# Patient Record
Sex: FEMALE | Race: WHITE
[De-identification: names, ages, dates, MRNs, and addresses within clinical notes are randomized per-mention and may not be internally consistent; named-entity substitution may affect disease eponyms.]

---

## 2022-01-21 ENCOUNTER — NURSE TRIAGE (OUTPATIENT)
Dept: OTHER | Facility: CLINIC | Age: 22
End: 2022-01-21

## 2022-01-22 NOTE — TELEPHONE ENCOUNTER
Reason for Disposition   [1] Caller has URGENT medicine question about med that PCP or specialist prescribed AND [2] triager unable to answer question    Protocols used: MEDICATION QUESTION CALL-ADULT-AH    Subjective: Caller states \"I have some questions about a medication I started taking last night\"     Current Symptoms: Heart palpitations, dilated pupils, neck feels stiff, tongue feels large. Patient spoke with prescribing doctor today and he told her to take 1/2 dose of new SSRI. Caller asking this RN if she should stop all together. Advised to reach out to PCP for that answer. Onset: 24 hours    Associated Symptoms: reduced activity    Pain Severity:NA    Temperature: NA    What has been tried: Nothing      Recommended disposition: Call PCP    Care advice provided, patient verbalizes understanding; denies any other questions or concerns; instructed to call back for any new or worsening symptoms. Will follow up with PCP    This triage is a result of a call to [address] [place] [address]. Please do not respond to the triage nurse through this encounter. Any subsequent communication should be directly with the patient.

## 2023-01-12 ENCOUNTER — NURSE TRIAGE (OUTPATIENT)
Dept: OTHER | Facility: CLINIC | Age: 23
End: 2023-01-12

## 2023-01-12 NOTE — TELEPHONE ENCOUNTER
Subjective: Caller states \"I am having a sore throat and congestion. And I am wondering if I can take mucinex with my other medications? \"   Writer reviewed med list with client.

## 2023-02-28 PROBLEM — K59.00 CONSTIPATION: Status: ACTIVE | Noted: 2023-02-28

## 2023-02-28 PROBLEM — R10.9 ABDOMINAL PAIN: Status: ACTIVE | Noted: 2023-02-28

## 2023-02-28 PROBLEM — L98.0 PYOGENIC GRANULOMA: Status: ACTIVE | Noted: 2023-02-28

## 2023-02-28 PROBLEM — F41.8 DEPRESSION WITH ANXIETY: Status: ACTIVE | Noted: 2023-02-28

## 2023-02-28 PROBLEM — N64.52 NIPPLE DISCHARGE: Status: ACTIVE | Noted: 2023-02-28

## 2023-02-28 PROBLEM — R31.9 BLOOD IN URINE: Status: ACTIVE | Noted: 2023-02-28

## 2023-02-28 PROBLEM — U07.1 COVID-19: Status: ACTIVE | Noted: 2023-02-28

## 2023-02-28 PROBLEM — N89.8 VAGINAL DISCHARGE: Status: ACTIVE | Noted: 2023-02-28

## 2023-02-28 PROBLEM — L03.031 PARONYCHIA OF TOE OF RIGHT FOOT: Status: ACTIVE | Noted: 2023-02-28

## 2023-02-28 PROBLEM — E03.9 HYPOTHYROIDISM: Status: ACTIVE | Noted: 2023-02-28

## 2023-02-28 PROBLEM — K60.2 ANAL FISSURE: Status: ACTIVE | Noted: 2023-02-28

## 2023-02-28 LAB — PROLACTIN (UG/L) IN SER/PLAS: 11.6 UG/L (ref 3–20)

## 2023-02-28 RX ORDER — LEVOTHYROXINE SODIUM 25 UG/1
1 TABLET ORAL DAILY
COMMUNITY
Start: 2022-01-18 | End: 2023-06-12 | Stop reason: ALTCHOICE

## 2023-02-28 RX ORDER — SPIRONOLACTONE 25 MG/1
1 TABLET ORAL DAILY
COMMUNITY

## 2023-02-28 RX ORDER — THYROID 30 MG/1
1 TABLET ORAL DAILY
COMMUNITY
Start: 2022-01-18 | End: 2023-06-12 | Stop reason: ALTCHOICE

## 2023-02-28 RX ORDER — SPIRONOLACTONE 100 MG/1
100 TABLET, FILM COATED ORAL DAILY
COMMUNITY

## 2023-03-06 ENCOUNTER — APPOINTMENT (OUTPATIENT)
Dept: PRIMARY CARE | Facility: CLINIC | Age: 23
End: 2023-03-06
Payer: COMMERCIAL

## 2023-03-17 ENCOUNTER — TELEPHONE (OUTPATIENT)
Dept: PRIMARY CARE | Facility: CLINIC | Age: 23
End: 2023-03-17
Payer: COMMERCIAL

## 2023-03-17 NOTE — TELEPHONE ENCOUNTER
Pt can call [facility], [phone] for an appt. I believe she needs to see GI first and they can decide if she needs an EGD, no order to be placed by PCP,    Thank you,  Leora Guallpa, DO

## 2023-03-17 NOTE — TELEPHONE ENCOUNTER
Pt is calling and saying at her last visit you gave her a referral for Gastroenterology to get an endoscopy done. When she called to schedule they told her this referral is for a regular office visit and they are booking out till September. Pt is asking if you can put in an order for an endoscopy so she can get this done sooner if possible?

## 2023-06-12 ENCOUNTER — OFFICE VISIT (OUTPATIENT)
Dept: PRIMARY CARE | Facility: CLINIC | Age: 23
End: 2023-06-12
Payer: COMMERCIAL

## 2023-06-12 VITALS — SYSTOLIC BLOOD PRESSURE: 90 MMHG | WEIGHT: 126 LBS | DIASTOLIC BLOOD PRESSURE: 62 MMHG

## 2023-06-12 DIAGNOSIS — J30.2 SEASONAL ALLERGIES: ICD-10-CM

## 2023-06-12 DIAGNOSIS — E03.9 HYPOTHYROIDISM, UNSPECIFIED TYPE: Primary | ICD-10-CM

## 2023-06-12 PROCEDURE — 1036F TOBACCO NON-USER: CPT | Performed by: FAMILY MEDICINE

## 2023-06-12 PROCEDURE — 99213 OFFICE O/P EST LOW 20 MIN: CPT | Performed by: FAMILY MEDICINE

## 2023-06-12 RX ORDER — LEVOTHYROXINE SODIUM 75 UG/1
75 TABLET ORAL
Qty: 30 TABLET | Refills: 2 | COMMUNITY
Start: 2023-04-27 | End: 2023-07-26

## 2023-06-12 ASSESSMENT — ENCOUNTER SYMPTOMS
SORE THROAT: 1
CARDIOVASCULAR NEGATIVE: 1
COUGH: 1
FATIGUE: 1
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
## 2023-08-07 ENCOUNTER — PATIENT OUTREACH (OUTPATIENT)
Dept: CARDIOLOGY | Facility: CLINIC | Age: 23
End: 2023-08-07
Payer: COMMERCIAL

## 2023-08-07 ENCOUNTER — TELEPHONE (OUTPATIENT)
Dept: PRIMARY CARE | Facility: CLINIC | Age: 23
End: 2023-08-07
Payer: COMMERCIAL

## 2023-08-07 ENCOUNTER — DOCUMENTATION (OUTPATIENT)
Dept: PRIMARY CARE | Facility: CLINIC | Age: 23
End: 2023-08-07
Payer: COMMERCIAL

## 2023-08-07 NOTE — TELEPHONE ENCOUNTER
Spoke with pt she states that her head hurts her when she gets up and when she is thinking. She said she doesn't have pain when she lays down. Pt states that she took Tylenol which helped with the pain a little. Pt would like to know should she still go to the ER?

## 2023-08-07 NOTE — PROGRESS NOTES
Discharge Facility: [facility]  Discharge Diagnosis: Migraine  Admission Date: 8-3-23  Discharge Date: 8-4-23    PCP Appointment Date: 8-10-23  Specialist Appointment Date: none  Hospital Encounter and Summary: Linked  See discharge assessment below for further details

## 2023-08-07 NOTE — TELEPHONE ENCOUNTER
Pt was in the ER for migraines. Pt still has head pain and the confusion has gotten better. Pt is concerned with the head pain. Pt scheduled an appt for 8-10-23 and wanted to know if it is okay to wait till then or if she needed to be seen sooner?

## 2023-08-10 ENCOUNTER — OFFICE VISIT (OUTPATIENT)
Dept: PRIMARY CARE | Facility: CLINIC | Age: 23
End: 2023-08-10
Payer: COMMERCIAL

## 2023-08-10 VITALS — DIASTOLIC BLOOD PRESSURE: 62 MMHG | SYSTOLIC BLOOD PRESSURE: 110 MMHG | WEIGHT: 124 LBS

## 2023-08-10 DIAGNOSIS — G43.809 OTHER MIGRAINE WITHOUT STATUS MIGRAINOSUS, NOT INTRACTABLE: Primary | ICD-10-CM

## 2023-08-10 DIAGNOSIS — K62.9 RECTAL ABNORMALITY: ICD-10-CM

## 2023-08-10 PROCEDURE — 1036F TOBACCO NON-USER: CPT | Performed by: FAMILY MEDICINE

## 2023-08-10 PROCEDURE — 99213 OFFICE O/P EST LOW 20 MIN: CPT | Performed by: FAMILY MEDICINE

## 2023-08-10 RX ORDER — RIBOFLAVIN (VITAMIN B2) 400 MG
1 TABLET ORAL DAILY
COMMUNITY
Start: 2023-08-04

## 2023-08-10 ASSESSMENT — ENCOUNTER SYMPTOMS
ABDOMINAL PAIN: 0
BLOOD IN STOOL: 0
APPETITE CHANGE: 1
FATIGUE: 1
FEVER: 0
CHEST TIGHTNESS: 0
CONSTIPATION: 1
DIARRHEA: 1
UNEXPECTED WEIGHT CHANGE: 0
ABDOMINAL DISTENTION: 0
RECTAL PAIN: 1
PALPITATIONS: 0
NAUSEA: 1

## 2023-08-10 ASSESSMENT — PATIENT HEALTH QUESTIONNAIRE - PHQ9
2. FEELING DOWN, DEPRESSED OR HOPELESS: SEVERAL DAYS
SUM OF ALL RESPONSES TO PHQ9 QUESTIONS 1 AND 2: 2
1. LITTLE INTEREST OR PLEASURE IN DOING THINGS: SEVERAL DAYS

## 2023-08-10 NOTE — PROGRESS NOTES
Subjective   Patient ID: Dawn Hernandez is a 22 y.o. female who presents for Hospital Follow-up (Ring around her anus, inflammation, pain, hurts to shower. Noticed it a month ago. Nothing otc. Still having regular bowel movement. ).    ER Follow Up:  - Patient note she still has slight balance issues and confusion, she feels like she is forgetting words every so often.   - Occasional brain fog, some recall issues  - Riboflavin was given at discharge by ER  - Appointment with neurology coming up tomorrow    Anal Lesion:  - Started 1-1.5 months. Keeps getting worse  - Hx of IBS-C and hemorrhoids  - Currently having 1-3 bowel movements/day  - Was having increased diarrhea but it has been transitioning back to constipation  - Pain with sitting, especially if it is tail bone focused  - Notes red blood when wiping every 2-3 days  - Pain with defecation  - Increased nausea   - Appointment with GI in 1 month         Review of Systems   Constitutional:  Positive for appetite change and fatigue. Negative for fever and unexpected weight change.   Respiratory:  Negative for chest tightness.         Shallow breathing   Cardiovascular:  Negative for chest pain, palpitations and leg swelling.   Gastrointestinal:  Positive for constipation, diarrhea, nausea and rectal pain. Negative for abdominal distention, abdominal pain and blood in stool.        Reflux and heartburn   Genitourinary: Negative.        Objective   /62   Wt 56.2 kg (124 lb)     Physical Exam  Vitals and nursing note reviewed. Exam conducted with a chaperone present.   Cardiovascular:      Rate and Rhythm: Normal rate and regular rhythm.   Pulmonary:      Effort: Pulmonary effort is normal.      Breath sounds: Normal breath sounds.   Abdominal:      General: Abdomen is flat. Bowel sounds are normal. There is no distension.      Palpations: Abdomen is soft. There is no mass.      Tenderness: There is no abdominal tenderness. There is no guarding or rebound.       Hernia: No hernia is present.   Genitourinary:     Rectum: Normal. No anal fissure or external hemorrhoid.      Comments: Redness around the anus  Neurological:      General: No focal deficit present.      Mental Status: She is alert and oriented to person, place, and time.      Cranial Nerves: No cranial nerve deficit.      Sensory: No sensory deficit.      Coordination: Coordination is intact.      Gait: Gait normal.      Deep Tendon Reflexes: Reflexes normal.   Psychiatric:         Mood and Affect: Mood normal.         Assessment/Plan   Diagnoses and all orders for this visit:  Other migraine without status migrainosus, not intractable  Rectal abnormality       Follow up with Neuro, appt is tomorrow   Follow up with GI for anal lesion.  Soak in lukewarm bath or use hemorrhoid wipes to help with the irritation/pain    RTC as needed    PRABHA Farrar    The patient was seen and evaluated by myself, as well as the medical student.  I agree with the note as above and have edited and addended the note.  Leora Guallpa, DO

## 2023-08-11 ENCOUNTER — PATIENT OUTREACH (OUTPATIENT)
Dept: CARDIOLOGY | Facility: CLINIC | Age: 23
End: 2023-08-11
Payer: COMMERCIAL

## 2023-08-11 NOTE — PROGRESS NOTES
Call regarding appt. with PCP on 9/10/23 after hospitalization.  At time of outreach call the patient feels as if their condition has (improved/worsened/returned to baseline) since last visit.  Reviewed the PCP appointment with the pt and addressed any questions or concerns.

## 2023-09-05 ENCOUNTER — PATIENT OUTREACH (OUTPATIENT)
Dept: CARDIOLOGY | Facility: CLINIC | Age: 23
End: 2023-09-05
Payer: COMMERCIAL

## 2023-09-05 NOTE — PROGRESS NOTES
Call placed regarding one month post discharge follow up call.  At time of outreach call the patient feels as if their condition has improved since initial visit with PCP or specialist.  Questions or concerns regarding recovery period addressed at this time. (Individualize as needed if questions arise)  Reviewed any PCP or specialists progress notes/labs/radiology reports if applicable and addressed any questions or concerns. Waiting on EEG

## 2023-10-18 ENCOUNTER — PATIENT OUTREACH (OUTPATIENT)
Dept: CARDIOLOGY | Facility: CLINIC | Age: 23
End: 2023-10-18
Payer: COMMERCIAL

## 2023-10-18 NOTE — PROGRESS NOTES
90 day call  CM spoke with patient to address any final questions or concerns. Patient states she follows up with Neuro tomorrow to go over EEG results. Patient reports she is improving, but still has some balance issues at this time but has improved. This patient has no further questions of concerns

## 2023-12-04 ENCOUNTER — TELEPHONE (OUTPATIENT)
Dept: PRIMARY CARE | Facility: CLINIC | Age: 23
End: 2023-12-04
Payer: COMMERCIAL

## 2024-01-17 ENCOUNTER — OFFICE VISIT (OUTPATIENT)
Dept: PRIMARY CARE | Facility: CLINIC | Age: 24
End: 2024-01-17
Payer: COMMERCIAL

## 2024-01-17 VITALS
SYSTOLIC BLOOD PRESSURE: 94 MMHG | WEIGHT: 134.4 LBS | DIASTOLIC BLOOD PRESSURE: 60 MMHG | BODY MASS INDEX: 25.94 KG/M2 | TEMPERATURE: 99 F

## 2024-01-17 DIAGNOSIS — L08.9 SKIN INFECTION: Primary | ICD-10-CM

## 2024-01-17 PROCEDURE — 1036F TOBACCO NON-USER: CPT | Performed by: FAMILY MEDICINE

## 2024-01-17 PROCEDURE — 99213 OFFICE O/P EST LOW 20 MIN: CPT | Performed by: FAMILY MEDICINE

## 2024-01-17 RX ORDER — CEPHALEXIN 250 MG/1
250 CAPSULE ORAL 4 TIMES DAILY
Qty: 28 CAPSULE | Refills: 0 | Status: SHIPPED | OUTPATIENT
Start: 2024-01-17 | End: 2024-01-24

## 2024-01-17 ASSESSMENT — PATIENT HEALTH QUESTIONNAIRE - PHQ9
4. FEELING TIRED OR HAVING LITTLE ENERGY: MORE THAN HALF THE DAYS
1. LITTLE INTEREST OR PLEASURE IN DOING THINGS: SEVERAL DAYS
SUM OF ALL RESPONSES TO PHQ9 QUESTIONS 1 AND 2: 3
7. TROUBLE CONCENTRATING ON THINGS, SUCH AS READING THE NEWSPAPER OR WATCHING TELEVISION: SEVERAL DAYS
5. POOR APPETITE OR OVEREATING: NEARLY EVERY DAY
6. FEELING BAD ABOUT YOURSELF - OR THAT YOU ARE A FAILURE OR HAVE LET YOURSELF OR YOUR FAMILY DOWN: MORE THAN HALF THE DAYS
3. TROUBLE FALLING OR STAYING ASLEEP OR SLEEPING TOO MUCH: SEVERAL DAYS
2. FEELING DOWN, DEPRESSED OR HOPELESS: MORE THAN HALF THE DAYS
10. IF YOU CHECKED OFF ANY PROBLEMS, HOW DIFFICULT HAVE THESE PROBLEMS MADE IT FOR YOU TO DO YOUR WORK, TAKE CARE OF THINGS AT HOME, OR GET ALONG WITH OTHER PEOPLE: SOMEWHAT DIFFICULT
8. MOVING OR SPEAKING SO SLOWLY THAT OTHER PEOPLE COULD HAVE NOTICED. OR THE OPPOSITE, BEING SO FIGETY OR RESTLESS THAT YOU HAVE BEEN MOVING AROUND A LOT MORE THAN USUAL: NOT AT ALL

## 2024-01-17 NOTE — PROGRESS NOTES
Subjective   Patient ID: Dawn Hernandez is a 23 y.o. female who presents for possible infection with ear piericing .    Pt presents with poss infection    Had a left ear piercing on 1/4  She feels this area is painful when she touches the area, it is also itching  No drainage  No fever, no chills          Review of Systems    Objective   BP 94/60 (BP Location: Right arm, Patient Position: Sitting)   Temp 37.2 °C (99 °F)   Wt 61 kg (134 lb 6.4 oz)   BMI 25.94 kg/m²     Physical Exam  Skin:     Comments: Left pinna, new piercing, slight erythema around the area  Scab noted, 1mm in size, posterior aspect  Tender to palpation         Assessment/Plan   Diagnoses and all orders for this visit:  Skin infection  -     cephalexin (Keflex) 250 mg capsule; Take 1 capsule (250 mg) by mouth 4 times a day for 7 days.    Keep area clean and dry  Advised to call with any new or worsening of her symptoms    Leora ALYSSA Guallpa, DO

## 2024-01-30 ENCOUNTER — TELEPHONE (OUTPATIENT)
Dept: PRIMARY CARE | Facility: CLINIC | Age: 24
End: 2024-01-30
Payer: COMMERCIAL

## 2024-01-30 NOTE — TELEPHONE ENCOUNTER
Called patient and she said she has an infection in her toe.  I gave her the podiatrist info you provided.  I told her to check with her insurance and if she needs a referral to call us back.

## 2024-01-30 NOTE — TELEPHONE ENCOUNTER
Dawn called today looking for a referral she said you gave to her on her last visit to a podiatrist.  I looked in her chart and Savi also looked in her chart.  Neither of us found anything.  Can you please call her?  [phone]